# Patient Record
Sex: FEMALE | Race: BLACK OR AFRICAN AMERICAN | Employment: FULL TIME | URBAN - METROPOLITAN AREA
[De-identification: names, ages, dates, MRNs, and addresses within clinical notes are randomized per-mention and may not be internally consistent; named-entity substitution may affect disease eponyms.]

---

## 2021-08-12 ENCOUNTER — APPOINTMENT (OUTPATIENT)
Dept: GENERAL RADIOLOGY | Age: 46
End: 2021-08-12
Attending: PHYSICIAN ASSISTANT
Payer: MEDICAID

## 2021-08-12 ENCOUNTER — APPOINTMENT (OUTPATIENT)
Dept: CT IMAGING | Age: 46
End: 2021-08-12
Attending: EMERGENCY MEDICINE
Payer: MEDICAID

## 2021-08-12 ENCOUNTER — APPOINTMENT (OUTPATIENT)
Dept: ULTRASOUND IMAGING | Age: 46
End: 2021-08-12
Attending: EMERGENCY MEDICINE
Payer: MEDICAID

## 2021-08-12 ENCOUNTER — HOSPITAL ENCOUNTER (EMERGENCY)
Age: 46
Discharge: HOME | End: 2021-08-12
Attending: EMERGENCY MEDICINE
Payer: MEDICAID

## 2021-08-12 VITALS
HEART RATE: 81 BPM | TEMPERATURE: 99 F | WEIGHT: 185 LBS | RESPIRATION RATE: 16 BRPM | OXYGEN SATURATION: 100 % | DIASTOLIC BLOOD PRESSURE: 102 MMHG | SYSTOLIC BLOOD PRESSURE: 146 MMHG | HEIGHT: 67 IN | BODY MASS INDEX: 29.03 KG/M2

## 2021-08-12 DIAGNOSIS — B96.89 BV (BACTERIAL VAGINOSIS): Primary | ICD-10-CM

## 2021-08-12 DIAGNOSIS — N76.0 BV (BACTERIAL VAGINOSIS): Primary | ICD-10-CM

## 2021-08-12 DIAGNOSIS — I10 HYPERTENSION, UNSPECIFIED TYPE: ICD-10-CM

## 2021-08-12 DIAGNOSIS — N83.202 CYST OF LEFT OVARY: ICD-10-CM

## 2021-08-12 LAB
ALBUMIN SERPL-MCNC: 3.6 G/DL (ref 3.4–5)
ALBUMIN/GLOB SERPL: 0.9 {RATIO} (ref 0.8–1.7)
ALP SERPL-CCNC: 52 U/L (ref 45–117)
ALT SERPL-CCNC: 27 U/L (ref 13–56)
ANION GAP SERPL CALC-SCNC: 5 MMOL/L (ref 3–18)
APPEARANCE UR: CLEAR
AST SERPL-CCNC: 12 U/L (ref 10–38)
ATRIAL RATE: 97 BPM
BACTERIA URNS QL MICRO: ABNORMAL /HPF
BASOPHILS # BLD: 0 K/UL (ref 0–0.1)
BASOPHILS NFR BLD: 1 % (ref 0–2)
BILIRUB SERPL-MCNC: 0.5 MG/DL (ref 0.2–1)
BILIRUB UR QL: NEGATIVE
BUN SERPL-MCNC: 12 MG/DL (ref 7–18)
BUN/CREAT SERPL: 12 (ref 12–20)
CALCIUM SERPL-MCNC: 8.1 MG/DL (ref 8.5–10.1)
CALCULATED P AXIS, ECG09: 64 DEGREES
CALCULATED R AXIS, ECG10: 48 DEGREES
CALCULATED T AXIS, ECG11: 49 DEGREES
CHLORIDE SERPL-SCNC: 109 MMOL/L (ref 100–111)
CK MB CFR SERPL CALC: 0.8 % (ref 0–4)
CK MB CFR SERPL CALC: NORMAL % (ref 0–4)
CK MB SERPL-MCNC: 1.1 NG/ML (ref 5–25)
CK MB SERPL-MCNC: <1 NG/ML (ref 5–25)
CK SERPL-CCNC: 146 U/L (ref 26–192)
CK SERPL-CCNC: 146 U/L (ref 26–192)
CO2 SERPL-SCNC: 24 MMOL/L (ref 21–32)
COLOR UR: YELLOW
CREAT SERPL-MCNC: 1 MG/DL (ref 0.6–1.3)
DIAGNOSIS, 93000: NORMAL
DIFFERENTIAL METHOD BLD: ABNORMAL
EOSINOPHIL # BLD: 0.3 K/UL (ref 0–0.4)
EOSINOPHIL NFR BLD: 5 % (ref 0–5)
EPITH CASTS URNS QL MICRO: ABNORMAL /LPF (ref 0–5)
ERYTHROCYTE [DISTWIDTH] IN BLOOD BY AUTOMATED COUNT: 14.6 % (ref 11.6–14.5)
GLOBULIN SER CALC-MCNC: 4.2 G/DL (ref 2–4)
GLUCOSE SERPL-MCNC: 120 MG/DL (ref 74–99)
GLUCOSE UR STRIP.AUTO-MCNC: NEGATIVE MG/DL
HCG UR QL: NEGATIVE
HCT VFR BLD AUTO: 35.4 % (ref 35–45)
HGB BLD-MCNC: 12.1 G/DL (ref 12–16)
HGB UR QL STRIP: ABNORMAL
KETONES UR QL STRIP.AUTO: NEGATIVE MG/DL
LEUKOCYTE ESTERASE UR QL STRIP.AUTO: NEGATIVE
LYMPHOCYTES # BLD: 1.6 K/UL (ref 0.9–3.6)
LYMPHOCYTES NFR BLD: 28 % (ref 21–52)
MAGNESIUM SERPL-MCNC: 2.1 MG/DL (ref 1.6–2.6)
MCH RBC QN AUTO: 28.3 PG (ref 24–34)
MCHC RBC AUTO-ENTMCNC: 34.2 G/DL (ref 31–37)
MCV RBC AUTO: 82.9 FL (ref 74–97)
MONOCYTES # BLD: 0.3 K/UL (ref 0.05–1.2)
MONOCYTES NFR BLD: 6 % (ref 3–10)
NEUTS SEG # BLD: 3.4 K/UL (ref 1.8–8)
NEUTS SEG NFR BLD: 61 % (ref 40–73)
NITRITE UR QL STRIP.AUTO: NEGATIVE
P-R INTERVAL, ECG05: 138 MS
PH UR STRIP: 5.5 [PH] (ref 5–8)
PLATELET # BLD AUTO: 351 K/UL (ref 135–420)
PMV BLD AUTO: 9 FL (ref 9.2–11.8)
POTASSIUM SERPL-SCNC: 3.6 MMOL/L (ref 3.5–5.5)
PROT SERPL-MCNC: 7.8 G/DL (ref 6.4–8.2)
PROT UR STRIP-MCNC: ABNORMAL MG/DL
Q-T INTERVAL, ECG07: 384 MS
QRS DURATION, ECG06: 90 MS
QTC CALCULATION (BEZET), ECG08: 487 MS
RBC # BLD AUTO: 4.27 M/UL (ref 4.2–5.3)
RBC #/AREA URNS HPF: ABNORMAL /HPF (ref 0–5)
SERVICE CMNT-IMP: NORMAL
SODIUM SERPL-SCNC: 138 MMOL/L (ref 136–145)
SP GR UR REFRACTOMETRY: 1.03 (ref 1–1.03)
TROPONIN I SERPL-MCNC: <0.02 NG/ML (ref 0–0.04)
TROPONIN I SERPL-MCNC: <0.02 NG/ML (ref 0–0.04)
UROBILINOGEN UR QL STRIP.AUTO: 1 EU/DL (ref 0.2–1)
VENTRICULAR RATE, ECG03: 97 BPM
WBC # BLD AUTO: 5.5 K/UL (ref 4.6–13.2)
WBC URNS QL MICRO: ABNORMAL /HPF (ref 0–4)
WET PREP GENITAL: NORMAL

## 2021-08-12 PROCEDURE — 87591 N.GONORRHOEAE DNA AMP PROB: CPT

## 2021-08-12 PROCEDURE — 85025 COMPLETE CBC W/AUTO DIFF WBC: CPT

## 2021-08-12 PROCEDURE — 82550 ASSAY OF CK (CPK): CPT

## 2021-08-12 PROCEDURE — 81025 URINE PREGNANCY TEST: CPT

## 2021-08-12 PROCEDURE — 83735 ASSAY OF MAGNESIUM: CPT

## 2021-08-12 PROCEDURE — 74177 CT ABD & PELVIS W/CONTRAST: CPT

## 2021-08-12 PROCEDURE — 74011000636 HC RX REV CODE- 636: Performed by: EMERGENCY MEDICINE

## 2021-08-12 PROCEDURE — 71046 X-RAY EXAM CHEST 2 VIEWS: CPT

## 2021-08-12 PROCEDURE — 81001 URINALYSIS AUTO W/SCOPE: CPT

## 2021-08-12 PROCEDURE — 99284 EMERGENCY DEPT VISIT MOD MDM: CPT

## 2021-08-12 PROCEDURE — 74011250637 HC RX REV CODE- 250/637: Performed by: EMERGENCY MEDICINE

## 2021-08-12 PROCEDURE — 80053 COMPREHEN METABOLIC PANEL: CPT

## 2021-08-12 PROCEDURE — 74011250636 HC RX REV CODE- 250/636: Performed by: EMERGENCY MEDICINE

## 2021-08-12 PROCEDURE — 93005 ELECTROCARDIOGRAM TRACING: CPT

## 2021-08-12 PROCEDURE — 96374 THER/PROPH/DIAG INJ IV PUSH: CPT

## 2021-08-12 PROCEDURE — 76830 TRANSVAGINAL US NON-OB: CPT

## 2021-08-12 PROCEDURE — 87210 SMEAR WET MOUNT SALINE/INK: CPT

## 2021-08-12 RX ORDER — HYDROCHLOROTHIAZIDE 25 MG/1
25 TABLET ORAL DAILY
Status: DISCONTINUED | OUTPATIENT
Start: 2021-08-13 | End: 2021-08-12

## 2021-08-12 RX ORDER — HYDROCHLOROTHIAZIDE 25 MG/1
25 TABLET ORAL DAILY
Qty: 10 TABLET | Refills: 0 | Status: SHIPPED | OUTPATIENT
Start: 2021-08-12 | End: 2021-09-11

## 2021-08-12 RX ORDER — KETOROLAC TROMETHAMINE 15 MG/ML
15 INJECTION, SOLUTION INTRAMUSCULAR; INTRAVENOUS ONCE
Status: COMPLETED | OUTPATIENT
Start: 2021-08-12 | End: 2021-08-12

## 2021-08-12 RX ORDER — HYDROCHLOROTHIAZIDE 25 MG/1
25 TABLET ORAL
Status: COMPLETED | OUTPATIENT
Start: 2021-08-12 | End: 2021-08-12

## 2021-08-12 RX ORDER — METRONIDAZOLE 500 MG/1
500 TABLET ORAL 2 TIMES DAILY
Qty: 14 TABLET | Refills: 0 | Status: SHIPPED | OUTPATIENT
Start: 2021-08-12 | End: 2021-08-19

## 2021-08-12 RX ADMIN — HYDROCHLOROTHIAZIDE 25 MG: 25 TABLET ORAL at 16:06

## 2021-08-12 RX ADMIN — IOPAMIDOL 100 ML: 612 INJECTION, SOLUTION INTRAVENOUS at 17:06

## 2021-08-12 RX ADMIN — KETOROLAC TROMETHAMINE 15 MG: 15 INJECTION, SOLUTION INTRAMUSCULAR; INTRAVENOUS at 18:46

## 2021-08-12 NOTE — ED NOTES
Pt awake/alert/calm/conversant without distress; respirations regular/non labored and decreased pain 5/10. Pt introduced to oncoming staff/given update on plan of care. Family member remains at bedside.

## 2021-08-12 NOTE — ED NOTES
Patient up for discharge. Discharge results have been reviewed with patient by the Provider. Armband removed and shredded per policy. Emphasized the importance of compliance with medications, discharge instructions, and outpatient follow up as listed. Patient discharged in stable condition with no apparent distress. Current Discharge Medication List      START taking these medications    Details   metroNIDAZOLE (FlagyL) 500 mg tablet Take 1 Tablet by mouth two (2) times a day for 7 days. Qty: 14 Tablet, Refills: 0  Start date: 8/12/2021, End date: 8/19/2021      !! hydroCHLOROthiazide (HYDRODIURIL) 25 mg tablet Take 1 Tablet by mouth daily for 30 days. Qty: 10 Tablet, Refills: 0  Start date: 8/12/2021, End date: 9/11/2021       !! - Potential duplicate medications found. Please discuss with provider. CONTINUE these medications which have NOT CHANGED    Details   !! hydroCHLOROthiazide (HYDRODIURIL) 25 mg tablet Take 1 Tab by mouth daily. Qty: 30 Tab, Refills: 0       !! - Potential duplicate medications found. Please discuss with provider.

## 2021-08-12 NOTE — ED NOTES
I performed a brief history of the patient here in triage and I have determined that pt will need further treatment and evaluation from the main side ER physician. I have placed initial orders based on the history to help in expediting patients care.      ERICH Reyes 2:32 PM

## 2021-08-12 NOTE — ED TRIAGE NOTES
Patient c/o pelvic pain since August 5. She states having left upper chest wall pain x 2 days. She states recent menstrual cycle, but c/o occasional bleeding noted when wiping post void.

## 2021-08-12 NOTE — ED PROVIDER NOTES
EMERGENCY DEPARTMENT HISTORY AND PHYSICAL EXAM    2:57 PM  Date: (Not on file)  Patient Name: Dena Cai    History of Presenting Illness       History Provided By:     HPI: Dena Cai is a 41 y.o. female with past medical history of GERD, hypertension, cholecystectomy, tubal ligation presents with chest pain and left lower abdominal pain for a week. Chest pains are sharp, last for few seconds, left-sided, no associated symptoms or modifying factors. Patient also with some left lower quadrant pain when she urinates. She states that saw some vaginal spotting. She has run out of antihypertensive medication         PCP: None    Past History     Past Medical History:  Past Medical History:   Diagnosis Date    Benign paroxysmal vertigo     Chest pain     GERD (gastroesophageal reflux disease)     HTN (hypertension)     Vasovagal episode        Past Surgical History:  Past Surgical History:   Procedure Laterality Date    HX CHOLECYSTECTOMY      HX TUBAL LIGATION         Family History:  History reviewed. No pertinent family history. Social History:  Social History     Tobacco Use    Smoking status: Former Smoker     Packs/day: 1.00     Years: 9.00     Pack years: 9.00    Smokeless tobacco: Never Used    Tobacco comment: every 2 days   Substance Use Topics    Alcohol use: Not Currently    Drug use: No       Allergies:  No Known Allergies    Review of Systems   Review of Systems   Constitutional: Negative for activity change, appetite change and chills. HENT: Negative for congestion, ear discharge, ear pain and sore throat. Eyes: Negative for photophobia and pain. Respiratory: Negative for cough and choking. Cardiovascular: Negative for palpitations and leg swelling. Gastrointestinal: Positive for abdominal pain. Negative for anal bleeding and rectal pain. Endocrine: Negative for polydipsia and polyuria. Genitourinary: Negative for genital sores and urgency. Musculoskeletal: Negative for arthralgias and myalgias. Neurological: Negative for dizziness, seizures and speech difficulty. Facial asymmetry:     Psychiatric/Behavioral: Negative for hallucinations, self-injury and suicidal ideas. Physical Exam     Patient Vitals for the past 12 hrs:   Temp Pulse Resp BP SpO2   08/12/21 1431 99 °F (37.2 °C) 81 16 (!) 154/100 100 %       Physical Exam  Vitals and nursing note reviewed. Constitutional:       Appearance: She is well-developed. HENT:      Head: Normocephalic and atraumatic. Eyes:      General:         Right eye: No discharge. Left eye: No discharge. Cardiovascular:      Rate and Rhythm: Normal rate and regular rhythm. Heart sounds: Normal heart sounds. No murmur heard. Pulmonary:      Effort: Pulmonary effort is normal. No respiratory distress. Breath sounds: Normal breath sounds. No stridor. No wheezing or rales. Chest:      Chest wall: No tenderness. Abdominal:      General: Bowel sounds are normal. There is no distension. Palpations: Abdomen is soft. Tenderness: There is abdominal tenderness. There is no guarding or rebound. Genitourinary:     Comments: Deferred vaginal exam  Musculoskeletal:         General: Normal range of motion. Cervical back: Normal range of motion and neck supple. Skin:     General: Skin is warm and dry. Neurological:      Mental Status: She is alert and oriented to person, place, and time.          Diagnostic Study Results     Labs -  Recent Results (from the past 12 hour(s))   EKG, 12 LEAD, INITIAL    Collection Time: 08/12/21  2:02 PM   Result Value Ref Range    Ventricular Rate 97 BPM    Atrial Rate 97 BPM    P-R Interval 138 ms    QRS Duration 90 ms    Q-T Interval 384 ms    QTC Calculation (Bezet) 487 ms    Calculated P Axis 64 degrees    Calculated R Axis 48 degrees    Calculated T Axis 49 degrees    Diagnosis       Normal sinus rhythm  Prolonged QT  Abnormal ECG  When compared with ECG of 18-NOV-2012 17:01,  No significant change was found         Radiologic Studies -   No results found. Medical Decision Making     ED Course: Progress Notes, Reevaluation, and Consults:    2:57 PM Initial assessment performed. The patients presenting problems have been discussed, and they/their family are in agreement with the care plan formulated and outlined with them. I have encouraged them to ask questions as they arise throughout their visit. Provider Notes (Medical Decision Making):   Patient presents with chest pain and ovarian cyst lower quadrant abdominal pain  EKG no ischemic changes, troponin negative, since symptoms have been more than 6 hours no need to repeat the troponin  X-ray chest no pneumonia  The character of the pain, and the fact that the last for few seconds is not consistent with cardiac etiology  Patient needs to start retaking antihypertensive medication. Hydrochlorothiazide will be prescribed for her but advised to follow-up with PMD  Wet prep showed clue cells patient will be treated for bacterial vaginosis  CT abdomen pelvis showed left ovarian cyst, further characterized by ultrasound, no ovarian torsion  Patient will be discharged with OB/GYN follow-up        Vital Signs-Reviewed the patient's vital signs. Reviewed pt's pulse ox reading. Records Reviewed: old medical records  -I am the first provider for this patient.  -I reviewed the vital signs, available nursing notes, past medical history, past surgical history, family history and social history. Current Outpatient Medications   Medication Sig Dispense Refill    LISINOPRIL PO Take  by mouth.  hydroCHLOROthiazide (HYDRODIURIL) 25 mg tablet Take 1 Tab by mouth daily. 30 Tab 0        Clinical Impression     Clinical Impression: No diagnosis found. Disposition: This note was dictated utilizing voice recognition software which may lead to typographical errors.   I apologize in advance if the situation occurs. If questions arise please do not hesitate to contact me or call our department.     Sherri Smalls MD  2:57 PM

## 2021-08-17 LAB
C TRACH RRNA SPEC QL NAA+PROBE: NEGATIVE
N GONORRHOEA RRNA SPEC QL NAA+PROBE: NEGATIVE
SPECIMEN SOURCE: NORMAL
T VAGINALIS RRNA VAG QL NAA+PROBE: NEGATIVE

## 2022-12-28 ENCOUNTER — HOSPITAL ENCOUNTER (EMERGENCY)
Age: 47
Discharge: HOME | End: 2022-12-29
Attending: EMERGENCY MEDICINE
Payer: MEDICAID

## 2022-12-28 ENCOUNTER — APPOINTMENT (OUTPATIENT)
Dept: GENERAL RADIOLOGY | Age: 47
End: 2022-12-28
Attending: EMERGENCY MEDICINE
Payer: MEDICAID

## 2022-12-28 DIAGNOSIS — R07.89 ATYPICAL CHEST PAIN: Primary | ICD-10-CM

## 2022-12-28 LAB
ALBUMIN SERPL-MCNC: 3.8 G/DL (ref 3.4–5)
ALBUMIN/GLOB SERPL: 0.9 {RATIO} (ref 0.8–1.7)
ALP SERPL-CCNC: 57 U/L (ref 45–117)
ALT SERPL-CCNC: 18 U/L (ref 13–56)
ANION GAP SERPL CALC-SCNC: 7 MMOL/L (ref 3–18)
AST SERPL-CCNC: 21 U/L (ref 10–38)
BASOPHILS # BLD: 0 K/UL (ref 0–0.1)
BASOPHILS NFR BLD: 0 % (ref 0–2)
BILIRUB SERPL-MCNC: 1 MG/DL (ref 0.2–1)
BUN SERPL-MCNC: 11 MG/DL (ref 7–18)
BUN/CREAT SERPL: 11 (ref 12–20)
CALCIUM SERPL-MCNC: 8.5 MG/DL (ref 8.5–10.1)
CHLORIDE SERPL-SCNC: 103 MMOL/L (ref 100–111)
CO2 SERPL-SCNC: 25 MMOL/L (ref 21–32)
COVID-19 RAPID TEST, COVR: NOT DETECTED
CREAT SERPL-MCNC: 0.98 MG/DL (ref 0.6–1.3)
DIFFERENTIAL METHOD BLD: ABNORMAL
EOSINOPHIL # BLD: 0 K/UL (ref 0–0.4)
EOSINOPHIL NFR BLD: 0 % (ref 0–5)
ERYTHROCYTE [DISTWIDTH] IN BLOOD BY AUTOMATED COUNT: 13.8 % (ref 11.6–14.5)
FLUAV AG NPH QL IA: NEGATIVE
FLUBV AG NOSE QL IA: NEGATIVE
GLOBULIN SER CALC-MCNC: 4.2 G/DL (ref 2–4)
GLUCOSE SERPL-MCNC: 108 MG/DL (ref 74–99)
HCG SERPL QL: NEGATIVE
HCT VFR BLD AUTO: 33.1 % (ref 35–45)
HGB BLD-MCNC: 11.3 G/DL (ref 12–16)
IMM GRANULOCYTES # BLD AUTO: 0 K/UL (ref 0–0.04)
IMM GRANULOCYTES NFR BLD AUTO: 1 % (ref 0–0.5)
LYMPHOCYTES # BLD: 0.3 K/UL (ref 0.9–3.6)
LYMPHOCYTES NFR BLD: 6 % (ref 21–52)
MCH RBC QN AUTO: 27.4 PG (ref 24–34)
MCHC RBC AUTO-ENTMCNC: 34.1 G/DL (ref 31–37)
MCV RBC AUTO: 80.1 FL (ref 78–100)
MONOCYTES # BLD: 0.1 K/UL (ref 0.05–1.2)
MONOCYTES NFR BLD: 2 % (ref 3–10)
NEUTS SEG # BLD: 4.8 K/UL (ref 1.8–8)
NEUTS SEG NFR BLD: 91 % (ref 40–73)
NRBC # BLD: 0 K/UL (ref 0–0.01)
NRBC BLD-RTO: 0 PER 100 WBC
PLATELET # BLD AUTO: 327 K/UL (ref 135–420)
PMV BLD AUTO: 9 FL (ref 9.2–11.8)
POTASSIUM SERPL-SCNC: 4.2 MMOL/L (ref 3.5–5.5)
PROT SERPL-MCNC: 8 G/DL (ref 6.4–8.2)
RBC # BLD AUTO: 4.13 M/UL (ref 4.2–5.3)
SODIUM SERPL-SCNC: 135 MMOL/L (ref 136–145)
SOURCE, COVRS: NORMAL
TROPONIN-HIGH SENSITIVITY: 5 NG/L (ref 0–54)
WBC # BLD AUTO: 5.3 K/UL (ref 4.6–13.2)

## 2022-12-28 PROCEDURE — 84703 CHORIONIC GONADOTROPIN ASSAY: CPT

## 2022-12-28 PROCEDURE — 87635 SARS-COV-2 COVID-19 AMP PRB: CPT

## 2022-12-28 PROCEDURE — 71046 X-RAY EXAM CHEST 2 VIEWS: CPT

## 2022-12-28 PROCEDURE — 99285 EMERGENCY DEPT VISIT HI MDM: CPT

## 2022-12-28 PROCEDURE — 85025 COMPLETE CBC W/AUTO DIFF WBC: CPT

## 2022-12-28 PROCEDURE — 84484 ASSAY OF TROPONIN QUANT: CPT

## 2022-12-28 PROCEDURE — 80053 COMPREHEN METABOLIC PANEL: CPT

## 2022-12-28 PROCEDURE — 93005 ELECTROCARDIOGRAM TRACING: CPT

## 2022-12-28 PROCEDURE — 87804 INFLUENZA ASSAY W/OPTIC: CPT

## 2022-12-29 VITALS
HEART RATE: 111 BPM | OXYGEN SATURATION: 97 % | SYSTOLIC BLOOD PRESSURE: 146 MMHG | WEIGHT: 165 LBS | DIASTOLIC BLOOD PRESSURE: 91 MMHG | BODY MASS INDEX: 25.9 KG/M2 | RESPIRATION RATE: 19 BRPM | TEMPERATURE: 99.5 F | HEIGHT: 67 IN

## 2022-12-29 LAB
ATRIAL RATE: 101 BPM
CALCULATED P AXIS, ECG09: 64 DEGREES
CALCULATED R AXIS, ECG10: 34 DEGREES
CALCULATED T AXIS, ECG11: 43 DEGREES
DIAGNOSIS, 93000: NORMAL
P-R INTERVAL, ECG05: 138 MS
Q-T INTERVAL, ECG07: 360 MS
QRS DURATION, ECG06: 84 MS
QTC CALCULATION (BEZET), ECG08: 466 MS
VENTRICULAR RATE, ECG03: 101 BPM

## 2022-12-29 RX ORDER — IBUPROFEN 600 MG/1
600 TABLET ORAL
Qty: 20 TABLET | Refills: 0 | Status: SHIPPED | OUTPATIENT
Start: 2022-12-29

## 2022-12-29 NOTE — ED TRIAGE NOTES
A&O female with c/o HTN, intermittent chest pain, lip swelling and n/v/d. Reports lip swelling upon waking in the AM that resolves by evening but returns next day x 2 days. Intermittent chest pain x 2 days. N/V/D that began today with body aches.

## 2022-12-29 NOTE — ED PROVIDER NOTES
EMERGENCY DEPARTMENT HISTORY AND PHYSICAL EXAM      Date: 12/28/2022  Patient Name: Kriss Calles      History of Presenting Illness     Chief Complaint   Patient presents with    Lip Swelling    Chest Pain    Hypertension    Diarrhea       Location/Duration/Severity/Modifying factors   Chief Complaint   Patient presents with    Lip Swelling    Chest Pain    Hypertension    Diarrhea       HPI:  Kriss Calles is a 56 y.o. female with a history as listed presents with a concern c/o having an evaluated BP, intermittent chest pain, lip swelling  and n/v/d. Patient reports her upper lip swelling upon awaking in the AM that resolves by evening but returns next day x 2 days. Intermittent chest pain x 2 days. N/V/D that began today with body aches. Location: face, chest  Duration: intermittent x 3 days   Quality: generalize aching pain  Severity: moderate  Modifying Factors: Additional History: patient has Covid 19 - 2 months ago    There are no other complaints, changes, or physical findings at this time. PCP: None        Past History     Past Medical History:  Past Medical History:   Diagnosis Date    Benign paroxysmal vertigo     Chest pain     GERD (gastroesophageal reflux disease)     HTN (hypertension)     Vasovagal episode        Past Surgical History:  Past Surgical History:   Procedure Laterality Date    HX CHOLECYSTECTOMY      HX TUBAL LIGATION         Family History:  History reviewed. No pertinent family history. Social History:  Social History     Tobacco Use    Smoking status: Former     Packs/day: 1.00     Years: 9.00     Pack years: 9.00     Types: Cigarettes    Smokeless tobacco: Never    Tobacco comments:     every 2 days   Substance Use Topics    Alcohol use: Not Currently    Drug use: No       Allergies:  No Known Allergies      Review of Systems     Review of Systems   Constitutional:  Positive for appetite change. Chills: decrease. HENT: Negative.           Mouth - no edema Eyes: Negative. Respiratory: Negative. Cardiovascular: Negative. Gastrointestinal: Negative. Endocrine: Negative. Genitourinary: Negative. Musculoskeletal: Negative. Skin: Negative. Allergic/Immunologic: Negative. Neurological: Negative. Hematological: Negative. Psychiatric/Behavioral: Negative. All other systems reviewed and are negative. Physical Exam     Physical Exam  Vitals and nursing note reviewed. Constitutional:       General: She is not in acute distress. Appearance: She is well-developed. She is not diaphoretic. HENT:      Head: Normocephalic. Right Ear: External ear normal.      Left Ear: External ear normal.      Mouth/Throat:      Pharynx: No oropharyngeal exudate. Eyes:      General: No scleral icterus. Right eye: No discharge. Left eye: No discharge. Conjunctiva/sclera: Conjunctivae normal.      Pupils: Pupils are equal, round, and reactive to light. Neck:      Thyroid: No thyromegaly. Vascular: No JVD. Trachea: No tracheal deviation. Cardiovascular:      Rate and Rhythm: Normal rate and regular rhythm. Heart sounds: Normal heart sounds. No murmur heard. No friction rub. No gallop. Pulmonary:      Effort: Pulmonary effort is normal. No respiratory distress. Breath sounds: Normal breath sounds. No stridor. No wheezing or rales. Chest:      Chest wall: No tenderness. Abdominal:      General: Bowel sounds are normal. There is no distension. Palpations: Abdomen is soft. There is no mass. Tenderness: There is no abdominal tenderness. There is no guarding or rebound. Musculoskeletal:         General: No tenderness. Normal range of motion. Cervical back: Normal range of motion and neck supple. Lymphadenopathy:      Cervical: No cervical adenopathy. Skin:     General: Skin is warm and dry. Coloration: Skin is not pale. Findings: No erythema or rash.    Neurological: Mental Status: She is alert and oriented to person, place, and time. Cranial Nerves: No cranial nerve deficit. Motor: No abnormal muscle tone. Coordination: Coordination normal.      Deep Tendon Reflexes: Reflexes normal.       Lab and Diagnostic Study Results     Labs -  Recent Results (from the past 24 hour(s))   EKG, 12 LEAD, INITIAL    Collection Time: 12/28/22  8:29 PM   Result Value Ref Range    Ventricular Rate 101 BPM    Atrial Rate 101 BPM    P-R Interval 138 ms    QRS Duration 84 ms    Q-T Interval 360 ms    QTC Calculation (Bezet) 466 ms    Calculated P Axis 64 degrees    Calculated R Axis 34 degrees    Calculated T Axis 43 degrees    Diagnosis       Sinus tachycardia  Possible Left atrial enlargement  Borderline ECG  When compared with ECG of 12-AUG-2021 14:02,  No significant change was found     CBC WITH AUTOMATED DIFF    Collection Time: 12/28/22  9:22 PM   Result Value Ref Range    WBC 5.3 4.6 - 13.2 K/uL    RBC 4.13 (L) 4.20 - 5.30 M/uL    HGB 11.3 (L) 12.0 - 16.0 g/dL    HCT 33.1 (L) 35.0 - 45.0 %    MCV 80.1 78.0 - 100.0 FL    MCH 27.4 24.0 - 34.0 PG    MCHC 34.1 31.0 - 37.0 g/dL    RDW 13.8 11.6 - 14.5 %    PLATELET 038 450 - 677 K/uL    MPV 9.0 (L) 9.2 - 11.8 FL    NRBC 0.0 0  WBC    ABSOLUTE NRBC 0.00 0.00 - 0.01 K/uL    NEUTROPHILS 91 (H) 40 - 73 %    LYMPHOCYTES 6 (L) 21 - 52 %    MONOCYTES 2 (L) 3 - 10 %    EOSINOPHILS 0 0 - 5 %    BASOPHILS 0 0 - 2 %    IMMATURE GRANULOCYTES 1 (H) 0.0 - 0.5 %    ABS. NEUTROPHILS 4.8 1.8 - 8.0 K/UL    ABS. LYMPHOCYTES 0.3 (L) 0.9 - 3.6 K/UL    ABS. MONOCYTES 0.1 0.05 - 1.2 K/UL    ABS. EOSINOPHILS 0.0 0.0 - 0.4 K/UL    ABS. BASOPHILS 0.0 0.0 - 0.1 K/UL    ABS. IMM.  GRANS. 0.0 0.00 - 0.04 K/UL    DF AUTOMATED     METABOLIC PANEL, COMPREHENSIVE    Collection Time: 12/28/22  9:22 PM   Result Value Ref Range    Sodium 135 (L) 136 - 145 mmol/L    Potassium 4.2 3.5 - 5.5 mmol/L    Chloride 103 100 - 111 mmol/L    CO2 25 21 - 32 mmol/L Anion gap 7 3.0 - 18 mmol/L    Glucose 108 (H) 74 - 99 mg/dL    BUN 11 7.0 - 18 MG/DL    Creatinine 0.98 0.6 - 1.3 MG/DL    BUN/Creatinine ratio 11 (L) 12 - 20      eGFR >60 >60 ml/min/1.73m2    Calcium 8.5 8.5 - 10.1 MG/DL    Bilirubin, total 1.0 0.2 - 1.0 MG/DL    ALT (SGPT) 18 13 - 56 U/L    AST (SGOT) 21 10 - 38 U/L    Alk. phosphatase 57 45 - 117 U/L    Protein, total 8.0 6.4 - 8.2 g/dL    Albumin 3.8 3.4 - 5.0 g/dL    Globulin 4.2 (H) 2.0 - 4.0 g/dL    A-G Ratio 0.9 0.8 - 1.7     TROPONIN-HIGH SENSITIVITY    Collection Time: 12/28/22  9:22 PM   Result Value Ref Range    Troponin-High Sensitivity 5 0 - 54 ng/L   HCG QL SERUM    Collection Time: 12/28/22  9:22 PM   Result Value Ref Range    HCG, Ql. Negative NEG     INFLUENZA A & B AG (RAPID TEST)    Collection Time: 12/28/22  9:54 PM   Result Value Ref Range    Influenza A Antigen Negative NEG      Influenza B Antigen Negative NEG     COVID-19 RAPID TEST    Collection Time: 12/28/22  9:54 PM   Result Value Ref Range    Specimen source Nasopharyngeal      COVID-19 rapid test Not detected           Radiologic Studies -   XR CHEST PA LAT    (Results Pending)         Procedures and Critical Care       Performed by: Rudy Golden MD    Procedures         Rudy Golden MD    Medical Decision Making and ED Course   - I am the first and primary provider for this patient AND AM THE PRIMARY PROVIDER OF RECORD. - I reviewed the vital signs, available nursing notes, past medical history, past surgical history, family history and social history. - Initial assessment performed. The patients presenting problems have been discussed, and the staff are in agreement with the care plan formulated and outlined with them. I have encouraged them to ask questions as they arise throughout their visit. Vital Signs-Reviewed the patient's vital signs.     Patient Vitals for the past 12 hrs:   Temp Pulse Resp BP SpO2   12/28/22 2033 99.5 °F (37.5 °C) (!) 105 18 (!) 143/92 98 %     Assessment/Differential Diagnosis:  (Medical Decision Making): Syndrome, Consider pneumonia, sepsis, SIRS, UTI, meningitis, PE/DVT, overdose, soft tissue infection, intra-abdominal process, COVID-19, flu syndrome, common cold, pneumonia, anxiety      ED Course/Medical Decision Making:  Patient presents with signs and symptoms concerning for possible infectious process, will check labs including lactate, CXR, UA, give fluids, Tylenol for fever. ED Course:     Stable throughout ER evaluation       ------------------------------------------------------------------------------------------------------------  EKG -  Interpreted by me    Chest x-ray: Interpreted by me    Lab test: Interpreted by me      Consultations:       Consultations: none      Disposition     Home. Follow-up PCP in 2 to 3 days. Return ER needed. Motrin q  6 h prn pain. Diagnosis     Clinical Impression: atypical chest pain.       Attestations:    Gorge Weeks MD

## 2025-04-06 ENCOUNTER — HOSPITAL ENCOUNTER (EMERGENCY)
Facility: HOSPITAL | Age: 50
Discharge: HOME | End: 2025-04-06
Attending: EMERGENCY MEDICINE
Payer: COMMERCIAL

## 2025-04-06 ENCOUNTER — APPOINTMENT (OUTPATIENT)
Facility: HOSPITAL | Age: 50
End: 2025-04-06
Payer: COMMERCIAL

## 2025-04-06 VITALS
BODY MASS INDEX: 28.25 KG/M2 | WEIGHT: 180 LBS | TEMPERATURE: 98.3 F | DIASTOLIC BLOOD PRESSURE: 98 MMHG | HEART RATE: 83 BPM | OXYGEN SATURATION: 98 % | SYSTOLIC BLOOD PRESSURE: 171 MMHG | RESPIRATION RATE: 16 BRPM | HEIGHT: 67 IN

## 2025-04-06 DIAGNOSIS — M25.571 ACUTE RIGHT ANKLE PAIN: Primary | ICD-10-CM

## 2025-04-06 DIAGNOSIS — R93.89 ABNORMAL X-RAY: ICD-10-CM

## 2025-04-06 PROCEDURE — 6370000000 HC RX 637 (ALT 250 FOR IP): Performed by: EMERGENCY MEDICINE

## 2025-04-06 PROCEDURE — 73610 X-RAY EXAM OF ANKLE: CPT

## 2025-04-06 PROCEDURE — 99283 EMERGENCY DEPT VISIT LOW MDM: CPT

## 2025-04-06 RX ORDER — IBUPROFEN 600 MG/1
600 TABLET, FILM COATED ORAL
Status: COMPLETED | OUTPATIENT
Start: 2025-04-06 | End: 2025-04-06

## 2025-04-06 RX ADMIN — IBUPROFEN 600 MG: 600 TABLET, FILM COATED ORAL at 12:44

## 2025-04-06 ASSESSMENT — PAIN - FUNCTIONAL ASSESSMENT: PAIN_FUNCTIONAL_ASSESSMENT: 0-10

## 2025-04-06 ASSESSMENT — LIFESTYLE VARIABLES
HOW OFTEN DO YOU HAVE A DRINK CONTAINING ALCOHOL: 2-4 TIMES A MONTH
HOW MANY STANDARD DRINKS CONTAINING ALCOHOL DO YOU HAVE ON A TYPICAL DAY: 1 OR 2

## 2025-04-06 ASSESSMENT — PAIN DESCRIPTION - ORIENTATION
ORIENTATION: RIGHT;POSTERIOR
ORIENTATION: RIGHT

## 2025-04-06 ASSESSMENT — PAIN DESCRIPTION - LOCATION
LOCATION: ANKLE
LOCATION: ANKLE

## 2025-04-06 ASSESSMENT — PAIN SCALES - GENERAL
PAINLEVEL_OUTOF10: 9
PAINLEVEL_OUTOF10: 8

## 2025-04-06 ASSESSMENT — PAIN DESCRIPTION - DESCRIPTORS: DESCRIPTORS: STABBING;SHARP

## 2025-04-06 NOTE — ED PROVIDER NOTES
Please note that this dictation was completed with [employer], the computer voice recognition software.  Quite often unanticipated grammatical, syntax, homophones, and other interpretive errors are inadvertently transcribed by the computer software.  Please disregard these errors.  Please excuse any errors that have escaped final proofreading.      MD Mayuri Zhang Mark A, MD  04/06/25 2314       Raul Messina MD  04/06/25 5401

## 2025-04-06 NOTE — DISCHARGE INSTRUCTIONS
Come back if you get worse.  Follow-up without fail.  Make sure you stay on crutches until orthopedic appointment.

## 2025-04-07 ENCOUNTER — TELEPHONE (OUTPATIENT)
Age: 50
End: 2025-04-07

## 2025-04-07 NOTE — TELEPHONE ENCOUNTER
Patient was seen at [facility] ED 4/6 for right ankle pain, and states she was diagnosed with a \"tendon tear\". Please advise when patient should come in to be seen.    Patient can be reached at [phone].